# Patient Record
Sex: FEMALE | Race: OTHER | HISPANIC OR LATINO | URBAN - METROPOLITAN AREA
[De-identification: names, ages, dates, MRNs, and addresses within clinical notes are randomized per-mention and may not be internally consistent; named-entity substitution may affect disease eponyms.]

---

## 2020-05-14 ENCOUNTER — EMERGENCY (EMERGENCY)
Facility: HOSPITAL | Age: 25
LOS: 1 days | Discharge: HOME | End: 2020-05-14
Attending: EMERGENCY MEDICINE
Payer: MEDICAID

## 2020-05-14 VITALS
OXYGEN SATURATION: 99 % | HEART RATE: 107 BPM | WEIGHT: 175.05 LBS | SYSTOLIC BLOOD PRESSURE: 134 MMHG | TEMPERATURE: 99 F | HEIGHT: 59 IN | RESPIRATION RATE: 19 BRPM | DIASTOLIC BLOOD PRESSURE: 92 MMHG

## 2020-05-14 VITALS
HEART RATE: 96 BPM | OXYGEN SATURATION: 100 % | RESPIRATION RATE: 16 BRPM | DIASTOLIC BLOOD PRESSURE: 77 MMHG | SYSTOLIC BLOOD PRESSURE: 120 MMHG

## 2020-05-14 PROCEDURE — 99284 EMERGENCY DEPT VISIT MOD MDM: CPT

## 2020-05-14 PROCEDURE — 99283 EMERGENCY DEPT VISIT LOW MDM: CPT

## 2020-05-14 PROCEDURE — 71046 X-RAY EXAM CHEST 2 VIEWS: CPT

## 2020-05-14 PROCEDURE — 71046 X-RAY EXAM CHEST 2 VIEWS: CPT | Mod: 26

## 2020-05-14 RX ORDER — IBUPROFEN 200 MG
600 TABLET ORAL ONCE
Refills: 0 | Status: COMPLETED | OUTPATIENT
Start: 2020-05-14 | End: 2020-05-14

## 2020-05-14 RX ADMIN — Medication 600 MILLIGRAM(S): at 23:25

## 2020-05-14 NOTE — ED PROVIDER NOTE - NSFOLLOWUPINSTRUCTIONS_ED_ALL_ED_FT
- Your symptoms are most likely secondary to resolving COVID infection and possible dehydration. Make sure to drink lots of fluids to stay hydrated!    - You should take ibuprofen 600 mg every 8 hrs. You may also take tylenol 650 mg every 8 hrs for pain.     - Please follow up with Dr. Allen on Monday    - You should also see a cardiologist for your symptoms. I have attached the contact above.       SEEK IMMEDIATE MEDICAL CARE IF YOU HAVE ANY OF THE FOLLOWING SYMPTOMS: worsening chest pain, coughing up blood, unexplained back/neck/jaw pain, severe abdominal pain, dizziness or lightheadedness, fainting, shortness of breath, sweaty or clammy skin, vomiting, or racing heart beat. These symptoms may represent a serious problem that is an emergency. Do not wait to see if the symptoms will go away. Get medical help right away. Call 911 and do not drive yourself to the hospital.

## 2020-05-14 NOTE — ED PROVIDER NOTE - CLINICAL SUMMARY MEDICAL DECISION MAKING FREE TEXT BOX
Frankel, Joseph (MD): 24y F COVID+ p/w palpitations and right sided chest pressure. Pt moderately tachycardic to 110s when provider went into room (90s when provider out of room). VS are otherwise stable. Pt is well appearing. Pt has already been ruled out for PE and story is unconcerning for PE. Pt has no clinical signs or symptoms suggestive of myocarditis or COVID. Will get CXR to R/O acute lung pathology. Will most likely discharge. Frankel, Joseph (MD): 24y F +COVID p/w palpitations and right sided chest pressure. Pt moderately tachycardic to 110s when provider went into room (90s when provider out of room). VS are otherwise stable. Pt is well appearing. Pt has already been ruled out for PE and story is unconcerning for PE. Pt has no clinical signs or symptoms suggestive of myocarditis or COVID. Will get CXR to R/O acute lung pathology. Will most likely discharge.    Mariangel Shearer): 24y F +COVID w/ palpitations had neg CTA. Very well appearing. Tachycardic when anxious but normal rate when relaxed in room. Possibly COVID. No concern for PE.

## 2020-05-14 NOTE — ED PROVIDER NOTE - NS_ ATTENDINGSCRIBEDETAILS _ED_A_ED_FT
Mariangel Shearer MD - Attending Physician: The scribe's documentation has been prepared under my direction and personally reviewed by me in its entirety. I confirm that the note above accurately reflects all work, treatment, procedures, and medical decision making performed by me.

## 2020-05-14 NOTE — ED PROVIDER NOTE - ATTENDING CONTRIBUTION TO CARE
Mariangel Shearer MD - Attending Physician: I have personally seen and examined this patient with the resident/fellow.  I have fully participated in the care of this patient. I have reviewed all pertinent clinical information, including history, physical exam, plan and the Resident/Fellow’s note and agree except as noted. See MDM

## 2020-05-14 NOTE — ED ADULT NURSE NOTE - OBJECTIVE STATEMENT
the patient is a 24y female sent to ED from PCP for tachycardia, patient with covid + result today following the patient is a 24y female sent to ED from PCP for tachycardia, patient with covid + result today following test last Friday, PMH Lupus, denies headache, dizzieness, chest pain shortness of breath, abd soft nontender, bed in lowest position, call bell in reach, comfort and safety provided.

## 2020-05-14 NOTE — ED PROVIDER NOTE - NSFOLLOWUPCLINICS_GEN_ALL_ED_FT
[facility]  Cardiology  [address] [place] [id number]  Phone: [phone]  Fax:   Follow Up Time:

## 2020-05-14 NOTE — ED PROVIDER NOTE - PROGRESS NOTE DETAILS
Joseph Frankel PGY1: Spoke with patient's PMD Dr. Allen who sent her in. Confirmed history that patient got CTA of chest last week. Agrees that patient has looked well overall over past few weeks. Sent patient in for possible concern for pericarditis. Given ECG low likelihood for pericarditis but per Dr. Allen will dc with ibuprofen and tylenol. Patient will follow up with Dr. Allen in the office on Monday. Will also give cardiology follow up. Discussed plan and return precautions with patient who understands and agrees.

## 2020-05-14 NOTE — ED PROVIDER NOTE - PATIENT PORTAL LINK FT
You can access the FollowMyHealth Patient Portal offered by [facility] by registering at the following website: [email or web address]. By joining [facility]’s FollowMyHealth portal, you will also be able to view your health information using other applications (apps) compatible with our system.

## 2020-05-14 NOTE — ED PROVIDER NOTE - OBJECTIVE STATEMENT
Galo Aguilera (DO): 24y F w/ no significant PMHx p/w palpitations and right sided CP. +COVID a few weeks ago. Pt was seen at outpatient, where they had a CT chest which was unremarkable. Despite this, pt continues to have chest pressure. Denies N/V/D, orthopnea, new onset swelling, and tingling in extremities. Reports no worsening or relieving factors for pain. Pt is currently on BC (progesterone only implant). Frankel, Joseph (MD): 24y F w/ no significant PMHx p/w palpitations and right sided CP. +COVID a few weeks ago. Pt was seen at outpatient, where they had a CT chest which was unremarkable. Despite this, pt continues to have chest pressure. Denies N/V/D, orthopnea, new onset swelling, and tingling in extremities. Reports no worsening or relieving factors for pain. Pt is currently on BC (progesterone only implant). Frankel, Joseph (MD): 24y F w/ no significant PMHx p/w palpitations and right sided CP. +COVID a few weeks ago. Pt was seen at outpatient, where they had a CTa chest which was unremarkable (without PE or other cause of pain). Despite this, pt continues to have palpitations and some nonspecific atypical CP. Denies N/V/D, orthopnea, new onset swelling, and tingling in extremities. Reports no worsening or relieving factors for pain. Pt is currently on BC (progesterone only implant). Was COVID+ on 5/12. Labs outpatient also unremarkable. Was supposed to have Echo today but due to COVID+ status, PMD sent her here instead

## 2021-10-25 ENCOUNTER — EMERGENCY (EMERGENCY)
Facility: HOSPITAL | Age: 26
LOS: 1 days | Discharge: HOME | End: 2021-10-25
Admitting: STUDENT IN AN ORGANIZED HEALTH CARE EDUCATION/TRAINING PROGRAM
Payer: MEDICAID

## 2021-10-25 VITALS
RESPIRATION RATE: 18 BRPM | HEIGHT: 59 IN | SYSTOLIC BLOOD PRESSURE: 142 MMHG | DIASTOLIC BLOOD PRESSURE: 85 MMHG | OXYGEN SATURATION: 99 % | TEMPERATURE: 98 F | HEART RATE: 88 BPM

## 2021-10-25 PROCEDURE — 99283 EMERGENCY DEPT VISIT LOW MDM: CPT

## 2021-10-25 RX ORDER — IBUPROFEN 200 MG
600 TABLET ORAL ONCE
Refills: 0 | Status: COMPLETED | OUTPATIENT
Start: 2021-10-25 | End: 2021-10-25

## 2021-10-25 RX ORDER — AZTREONAM 2 G
1 VIAL (EA) INJECTION
Qty: 14 | Refills: 0
Start: 2021-10-25 | End: 2021-10-31

## 2021-10-25 RX ADMIN — Medication 600 MILLIGRAM(S): at 19:14

## 2021-10-25 NOTE — ED PROVIDER NOTE - NSFOLLOWUPINSTRUCTIONS_ED_ALL_ED_FT
Advance activity as tolerated.  Continue all previously prescribed medications as directed unless otherwise instructed.  Take Bactrim one pill twice a day for 7 days.  Take Tylenol 650mg (Two 325 mg pills) every 4-6 hours as needed for pain or fevers. Take Motrin (also sold as Advil or Ibuprofen) 400-600 mg (two or three 200 mg over the counter pills) every 8 hours as needed for moderate pain or fevers-- take with food.  Follow up with your primary care physician and dermatology (referral list provided) in 48-72 hours- bring copies of your results.  Return to the ER for worsening or persistent symptoms, including but not limited worsening or persistent pain, fevers, swelling, headaches, and/or ANY NEW OR CONCERNING SYMPTOMS. If you have issues obtaining follow up, please call: [phone] to obtain a doctor or specialist who takes your insurance in your area.  You may call [phone] to make an appointment with the internal medicine clinic.

## 2021-10-25 NOTE — ED PROVIDER NOTE - PATIENT PORTAL LINK FT
You can access the FollowMyHealth Patient Portal offered by [facility] by registering at the following website: [email or web address]. By joining [facility]’s FollowMyHealth portal, you will also be able to view your health information using other applications (apps) compatible with our system.

## 2021-10-25 NOTE — ED PROVIDER NOTE - SKIN LOCATION #1
~0.5 cm x 0.5 cm region of induration, redness, tenderness w/o fluctuance, no crepitus at occipital aspect of scalp

## 2021-10-25 NOTE — ED PROVIDER NOTE - PROGRESS NOTE DETAILS
MARIANELA MERRILL:  Pt medically stable for discharge.  Strict return precautions given.  Pt to follow up with PMD and dermatology.

## 2021-10-25 NOTE — ED PROVIDER NOTE - CLINICAL SUMMARY MEDICAL DECISION MAKING FREE TEXT BOX
Pt is a 25 y/o F PMhx antiphospholipid syndrome p/w scalp pain x 4 days. -- likely cellulitis; not clinically concerning for abscess -- antibiotics, derm follow up

## 2021-10-25 NOTE — ED PROVIDER NOTE - OBJECTIVE STATEMENT
Pt is a 25 y/o F PMhx antiphospholipid syndrome p/w scalp pain x 4 days.  Pt reports she has had a hard lump at occipital aspect of head for past 2 months.  Pt reports over past 4 days she began to develop pain, swelling and tenderness at the region of the lump.  Pt denies any fevers, chills, trauma, headache, or any other specific complaints.

## 2021-10-26 PROBLEM — Z78.9 OTHER SPECIFIED HEALTH STATUS: Chronic | Status: ACTIVE | Noted: 2020-05-14

## 2023-06-19 NOTE — ED ADULT NURSE NOTE - NSSEPSISSUSPECTED_ED_A_ED
Problem: Adult Inpatient Plan of Care  Goal: Plan of Care Review  Outcome: Ongoing, Not Progressing  Goal: Patient-Specific Goal (Individualized)  Outcome: Ongoing, Not Progressing  Goal: Absence of Hospital-Acquired Illness or Injury  Outcome: Ongoing, Not Progressing  Goal: Optimal Comfort and Wellbeing  Outcome: Ongoing, Not Progressing  Goal: Readiness for Transition of Care  Outcome: Ongoing, Not Progressing     Problem: Infection  Goal: Absence of Infection Signs and Symptoms  Outcome: Ongoing, Not Progressing     Problem: Fall Injury Risk  Goal: Absence of Fall and Fall-Related Injury  Outcome: Ongoing, Not Progressing     Problem: Skin Injury Risk Increased  Goal: Skin Health and Integrity  Outcome: Ongoing, Not Progressing     Problem: Device-Related Complication Risk (Hemodialysis)  Goal: Safe, Effective Therapy Delivery  Outcome: Ongoing, Not Progressing     Problem: Hemodynamic Instability (Hemodialysis)  Goal: Effective Tissue Perfusion  Outcome: Ongoing, Not Progressing     Problem: Infection (Hemodialysis)  Goal: Absence of Infection Signs and Symptoms  Outcome: Ongoing, Not Progressing      No

## 2023-10-21 NOTE — ED PROVIDER NOTE - NSPTACCESSSVCSAPPTDETAILS_ED_ALL_ED_FT
EMERGENCY DEPARTMENT SIGN OUT NOTE        ED COURSE AND MEDICAL DECISION MAKING  Patient was signed out to me by Dr Rachelle Wright at 7:07 AM     In brief, Chanel Cotto is a 31 year old female who initially presented with alcohol intoxication     At time of sign out, disposition was pending labs and pick-up.    9:24 AM I rechecked and updated the patient. We discussed the plan for discharge and the patient is agreeable. Reviewed supportive cares, symptomatic treatment, outpatient follow up, and reasons to return to the Emergency Department. Patient to be discharged by ED RN.    Current Discharge Medication List        START taking these medications    Details   cloNIDine (CATAPRES) 0.1 MG tablet Take 1 tablet (0.1 mg) by mouth 3 times daily  Qty: 12 tablet, Refills: 0      famotidine (PEPCID) 20 MG tablet Take 1 tablet (20 mg) by mouth 2 times daily  Qty: 20 tablet, Refills: 0      ondansetron (ZOFRAN ODT) 4 MG ODT tab Take 1 tablet (4 mg) by mouth every 8 hours as needed  Qty: 10 tablet, Refills: 0            FINAL IMPRESSION    1. Alcoholic intoxication without complication (H24)    2. Forehead contusion, initial encounter    3. Other sprain of right shoulder joint, initial encounter        ED MEDS  Medications   sodium chloride 0.9% BOLUS 1,000 mL (0 mLs Intravenous Stopped 10/21/23 0605)   sodium chloride 0.9 % 1,000 mL with Infuvite Adult 10 mL, thiamine 100 mg, folic acid 1 mg infusion ( Intravenous Stopped 10/21/23 0605)       LAB  Labs Ordered and Resulted from Time of ED Arrival to Time of ED Departure   COMPREHENSIVE METABOLIC PANEL - Abnormal       Result Value    Sodium 146 (*)     Potassium 3.8      Carbon Dioxide (CO2) 21 (*)     Anion Gap 14      Urea Nitrogen 5.4 (*)     Creatinine 0.54      GFR Estimate >90      Calcium 8.9      Chloride 111 (*)     Glucose 101 (*)     Alkaline Phosphatase 55      AST 25      ALT 14      Protein Total 7.7      Albumin 4.8      Bilirubin Total 0.2     ETHYL  ALCOHOL LEVEL - Abnormal    Alcohol ethyl 0.39 (*)    MAGNESIUM - Abnormal    Magnesium 2.6 (*)    CBC WITH PLATELETS AND DIFFERENTIAL - Abnormal    WBC Count 12.3 (*)     RBC Count 4.33      Hemoglobin 13.9      Hematocrit 41.0      MCV 95      MCH 32.1      MCHC 33.9      RDW 13.7      Platelet Count 223      % Neutrophils 75      % Lymphocytes 16      % Monocytes 7      Mids % (Monos, Eos, Basos)        % Eosinophils 0      % Basophils 1      % Immature Granulocytes 1      NRBCs per 100 WBC 0      Absolute Neutrophils 9.3 (*)     Absolute Lymphocytes 1.9      Absolute Monocytes 0.9      Mids Abs (Monos, Eos, Basos)        Absolute Eosinophils 0.0      Absolute Basophils 0.1      Absolute Immature Granulocytes 0.1      Absolute NRBCs 0.0     HCG QUALITATIVE PREGNANCY - Normal    hCG Serum Qualitative Negative     ROUTINE UA WITH MICROSCOPIC REFLEX TO CULTURE   URINE DRUG SCREEN PANEL       EKG  None    RADIOLOGY    XR Shoulder Right 2 Views   Final Result   IMPRESSION: Normal joint spaces and alignment. No fracture.      CT Head w/o Contrast   Final Result   IMPRESSION:   1.  No acute intracranial process.          DISCHARGE MEDS  New Prescriptions    No medications on file       I, Berenice Goldman, am serving as a scribe to document services personally performed by Ender Amaya MD, based on my observations and the provider's statements to me.  I, Ender Amaya MD, attest that Berenice Goldman is acting in a scribe capacity, has observed my performance of the services and has documented them in accordance with my direction.     Ender Amaya MD  [facility] EMERGENCY ROOM  [facility] [facility] [id number] [phone]       Ender Amaya MD  10/21/23 0901       Ender Amaya MD  10/21/23 0926     cellulitis, scalp lump; URGENT